# Patient Record
Sex: FEMALE | Race: BLACK OR AFRICAN AMERICAN
[De-identification: names, ages, dates, MRNs, and addresses within clinical notes are randomized per-mention and may not be internally consistent; named-entity substitution may affect disease eponyms.]

---

## 2017-01-17 ENCOUNTER — HOSPITAL ENCOUNTER (EMERGENCY)
Dept: HOSPITAL 10 - FTE | Age: 72
Discharge: HOME | End: 2017-01-17
Payer: MEDICARE

## 2017-01-17 VITALS
HEART RATE: 56 BPM | RESPIRATION RATE: 18 BRPM | DIASTOLIC BLOOD PRESSURE: 69 MMHG | SYSTOLIC BLOOD PRESSURE: 140 MMHG | TEMPERATURE: 98.3 F

## 2017-01-17 VITALS
WEIGHT: 184.09 LBS | BODY MASS INDEX: 31.43 KG/M2 | HEIGHT: 64 IN | WEIGHT: 184.09 LBS | HEIGHT: 64 IN | BODY MASS INDEX: 31.43 KG/M2

## 2017-01-17 DIAGNOSIS — Z79.82: ICD-10-CM

## 2017-01-17 DIAGNOSIS — I10: ICD-10-CM

## 2017-01-17 DIAGNOSIS — J20.9: Primary | ICD-10-CM

## 2017-01-17 PROCEDURE — 87400 INFLUENZA A/B EACH AG IA: CPT

## 2017-01-17 PROCEDURE — 99284 EMERGENCY DEPT VISIT MOD MDM: CPT

## 2017-01-17 PROCEDURE — 94664 DEMO&/EVAL PT USE INHALER: CPT

## 2017-01-17 NOTE — ERD
ER Documentation


Chief Complaint


Date/Time


DATE: 1/17/17 


TIME: 17:32


Chief Complaint


WHEEZING AND FEVER





HPI


This is a 71-year-old female who presents to the emergency room for evaluation 

of wheezing.  According to the patient's family member was at bedside this 

patient has been wheezing for the past 2 days.  She states the patient does 

have a fever which is periodic, and the patient was brought to the ER for 

evaluation.  She states that last sentence patient had the symptoms she was 

diagnosed with bronchitis.  Patient has been using her nephew's albuterol 

inhaler with mild relief of her wheezing





ROS


All systems reviewed and are negative except as per history of present illness.





Medications


Home Meds


Active Scripts


Famotidine* (Famotidine*) 20 Mg Tablet, 20 MG PO Q12 for 30 Days, #60 TAB


   Prov:YOMAIRA KENNEDY M.D.         6/5/16


Aspirin (Aspirin) 81 Mg Chew, 81 MG PO DAILY for 30 Days, #30 TAB


   Prov:YOMAIRA KENNEDY M.D.         6/5/16


Reported Medications


Losartan Potassium* (Cozaar*) 100 Mg Tablet, 100 MG PO DAILY, #30 TAB


   6/4/16


Simvastatin* (Zocor*) 20 Mg Tablet, 20 MG PO QHS, #30 TAB


   6/4/16


Amlodipine Besylate* (Norvasc*) 10 Mg Tablet, 10 MG PO DAILY, TAB


   6/4/16


Metoprolol Succinate* (Toprol XL*) 100 Mg Tab.sr.24h, 100 MG PO DAILY, #30 TAB


   6/4/16





Allergies


Allergies:  


Coded Allergies:  


     No Known Allergy (Unverified , 6/4/16)





PMhx/Soc


History of Surgery:  No


Anesthesia Reaction:  No


Hx Neurological Disorder:  No


Hx Respiratory Disorders:  No


Hx Cardiac Disorders:  Yes (HTN, HYPERCHOLESTEROL)


Hx Psychiatric Problems:  No


Hx Miscellaneous Medical Probl:  No


Hx Alcohol Use:  No


Hx Substance Use:  No


Hx Tobacco Use:  No





Physical Exam


Vitals





Vital Signs








  Date Time  Temp Pulse Resp B/P Pulse Ox O2 Delivery O2 Flow Rate FiO2


 


1/17/17 16:46  60 20  96   21


 


1/17/17 15:47 98.0 58 18 162/72 95   








Physical Exam


Const: No acute distress


Head:   Atraumatic 


Eyes:    Normal Conjunctiva


ENT:    Normal External Ears, Nose and Mouth.


Neck:               Full range of motion..~ No meningismus.


Resp:   Wheezing auscultated in the bilateral upper and lower lobes


Cardio:    Regular rate and rhythm, no murmurs


Abd:    Soft, non tender, non distended. Normal bowel sounds


Skin:    No petechiae or rashes


Back:    No midline or flank tenderness


Ext:    No cyanosis, or edema


Neur:    Awake and alert


Psych:    Normal Mood and Affect


Results 24 hrs





 Current Medications








 Medications


  (Trade)  Dose


 Ordered  Sig/Brittni


 Route


 PRN Reason  Start Time


 Stop Time Status Last Admin


Dose Admin


 


 Albuterol


  (Proventil 0.5%


  (Neb))  5 mg  ONCE  STAT


 NEB


   1/17/17 16:37


 1/17/17 16:38 DC 1/17/17 16:45


 


 


 Ipratropium


 Bromide


  (Atrovent 0.02%


  (Neb))  0.5 mg  ONCE  STAT


 NEB


   1/17/17 16:37


 1/17/17 16:38 DC 1/17/17 16:44


 


 


 Prednisone


  (Prednisone)  60 mg  ONCE  STAT


 PO


   1/17/17 16:37


 1/17/17 16:38 DC 1/17/17 16:44


 











Procedures/MDM


This 71-year-old female presents to the ER for evaluation of wheezing.  When I 

evaluated her this patient did have wheezing on my examination.  The patient 

was given a breathing treatment with albuterol, Atrovent.  This patient also 

receives p.o. prednisone.  On my reexamination this patient she is sitting in 

bed in no acute distress, her pulse ox is 100%.  Her wheezing has significantly 

subsided.  This patient will be discharged home at this time with a 

prescription for ProAir air, and prednisone.  Patient is influenza negative.





Departure


Diagnosis:  


 Primary Impression:  


 Acute bronchitis


 Additional Impression:  


 Wheezing


Condition:  Stable











LESVIA BRAXTON DO Jan 17, 2017 17:34

## 2017-01-22 ENCOUNTER — HOSPITAL ENCOUNTER (EMERGENCY)
Dept: HOSPITAL 10 - FTE | Age: 72
Discharge: HOME | End: 2017-01-22
Payer: MEDICARE

## 2017-01-22 VITALS — BODY MASS INDEX: 39.8 KG/M2 | WEIGHT: 171.96 LBS | HEIGHT: 55 IN

## 2017-01-22 DIAGNOSIS — J20.9: Primary | ICD-10-CM

## 2017-01-22 DIAGNOSIS — F17.210: ICD-10-CM

## 2017-01-22 DIAGNOSIS — I10: ICD-10-CM

## 2017-01-22 PROCEDURE — 71010: CPT

## 2017-01-22 NOTE — RADRPT
PROCEDURE:   XR Chest. 

 

CLINICAL INDICATION:   Productive cough.

 

TECHNIQUE:   Single frontal view. 

 

COMPARISON:   06/04/2016. 

 

FINDINGS:

The heart is enlarged.  There is calcification in the aorta consistent with atherosclerosis. 

The lungs are clear. 

There is no pleural effusion. 

There is no pneumothorax.   

 

IMPRESSION:

1.  Cardiomegaly and atherosclerosis.

2.  Clear lungs.  

 

RPTAT: QQ

_____________________________________________ 

.Jamar Menjivar MD, MD           Date    Time 

Electronically viewed and signed by .Jamar Menjivar MD, MD on 01/22/2017 11:52 

 

D:  01/22/2017 11:52  T:  01/22/2017 11:52

.R/

## 2017-01-22 NOTE — ERD
DATE OF SERVICE:  

 

 

HISTORY OF PRESENT ILLNESS:  The patient is a 71-year-old female coming in complaining of a cough fo
r 2 weeks.  Patient was seen here 4 days ago and was given prednisone inhaler but daughter states th
at she is still continuing having shortness of breath.  She denies any cardiac history, she has not 
taken antibiotics.  She denies any leg swelling, no hemoptysis.  No recent travel. She has had tacti
le fevers. No history of recent surgery. 

No cancer history.

 

SOCIAL HISTORY:  She does smoke 10 cigarettes a day.

 

MEDICAL PROBLEMS:  Hypertension.

 

ALLERGIES TO MEDICATIONS:  DENIES.

 

SURGICAL HISTORY:  Hysterectomy.

 

REVIEW OF SYSTEMS:  A 12-point review of systems was done.  Refer to HPI for positives, all other sy
stems negative.

 

PHYSICAL EXAMINATION

VITAL SIGNS:  Temperature 98, pulse 68, blood pressure 171/71, respiratory 18, O2 saturation 99% on 
room air.  Pain intensity is 0/10.

GENERAL:  The patient is well-appearing, well-nourished, no acute distress.

HEENT:  Atraumatic. Conjunctivae are pink. Pupils equal, round, and reactive to light. There is no s
cleral icterus.  Tympanic membranes clear bilaterally. Oropharynx clear. No nystagmus or photophobia
. 

LUNGS:  The patient has coarse breath sounds heard throughout.  There is no focal rhonchi, no wheezi
ng.  Breath sounds heard in all lung fields.  No stridor, no trismus and no drooling.

CHEST:  Clear to auscultation bilaterally. There are no rales, wheezes or rhonchi. 

EXTREMITIES:  There is no pitting edema noted to bilateral lower extremities.  Pulses are intact.

BACK:  No midline or flank tenderness.

SKIN:  There is no apparent rash or petechia. The skin is warm and dry.

 

EMERGENCY ROOM COURSE:  The patient had a 1-view chest x-ray done in the ER which showed cardiomegal
y and atherosclerosis, otherwise clear lungs.

 

DIAGNOSIS:  Bronchitis with antibiotic treatment.

 

MEDICAL DECISION MAKING:  Patient did have coarse breath sounds heard on auscultation.  I will treat
 patient with antibiotics; however, patient is afebrile.  The patient's oxygen saturation was reeval
uated and she continued to be 99% with walking on room air.  I have low suspicion for a CHF as patie
nt does not have abnormal findings on chest x-ray and patient does not have lower pitting edema to t
he extremities.  I have low suspicion for PE.  The patient is not tachycardic.  She is not complaini
ng of hemoptysis or pleuritic chest pain and I do not feel there is indication for blood work or fur
ther evaluation.  I have low suspicion for sepsis.  Patient was given antibiotics and told to have c
lose followup with primary doctor.  Low suspicion for cardiac abnormality as patient is not complain
ing of chest pain.  Patient has a Acharya complaining of cough.

 

DISCHARGE:  The patient is discharged stable.  Patient is given a prescription for doxycycline and t
old to follow up with primary care within 1 to 2 days for reevaluation.  The patient was told if sym
ptoms progress or worsen to return to the ER.  All other questions answered at time of discharge.  D
ischarge summary given at the time of departure.  Patient understood and complied with plan.

 

 

Dictated By: ERICA MCLAUGHLIN PA for DAE CHOU/JR

DD:    01/22/2017 13:18:48

DT:    01/22/2017 17:16:32

Conf#: [id number]

DID#:  [id number]

## 2018-10-15 ENCOUNTER — HOSPITAL ENCOUNTER (OUTPATIENT)
Age: 73
Discharge: HOME | End: 2018-10-15

## 2018-10-15 ENCOUNTER — HOSPITAL ENCOUNTER (OUTPATIENT)
Dept: HOSPITAL 91 - LAB | Age: 73
Discharge: HOME | End: 2018-10-15
Payer: MEDICARE

## 2018-10-15 DIAGNOSIS — Z01.818: Primary | ICD-10-CM

## 2018-10-15 LAB
ADD MAN DIFF?: NO
ADD UMIC: YES
ALANINE AMINOTRANSFERASE: 21 IU/L (ref 13–69)
ALBUMIN/GLOBULIN RATIO: 1.02
ALBUMIN: 3.7 G/DL (ref 3.3–4.9)
ALKALINE PHOSPHATASE: 61 IU/L (ref 42–121)
ANION GAP: 11 (ref 8–16)
ASPARTATE AMINO TRANSFERASE: 29 IU/L (ref 15–46)
BASOPHIL #: 0.1 10^3/UL (ref 0–0.1)
BASOPHILS %: 0.8 % (ref 0–2)
BILIRUBIN,DIRECT: 0 MG/DL (ref 0–0.2)
BILIRUBIN,TOTAL: 0.4 MG/DL (ref 0.2–1.3)
BLOOD UREA NITROGEN: 23 MG/DL (ref 7–20)
CALCIUM: 10 MG/DL (ref 8.4–10.2)
CARBON DIOXIDE: 33 MMOL/L (ref 21–31)
CHLORIDE: 103 MMOL/L (ref 97–110)
CHOL/HDL RATIO: 3.4 RATIO
CHOLESTEROL: 181 MG/DL (ref 100–200)
CREATININE: 0.78 MG/DL (ref 0.44–1)
EOSINOPHILS #: 0.1 10^3/UL (ref 0–0.5)
EOSINOPHILS %: 1.7 % (ref 0–7)
GLOBULIN: 3.6 G/DL (ref 1.3–3.2)
GLUCOSE: 104 MG/DL (ref 70–220)
HDL CHOLESTEROL: 53 MG/DL (ref 33–92)
HEMATOCRIT: 47.4 % (ref 37–47)
HEMOGLOBIN: 14.9 G/DL (ref 12–16)
IMMATURE GRANS #M: 0.03 10^3/UL (ref 0–0.03)
IMMATURE GRANS % (M): 0.5 % (ref 0–0.43)
INR: 0.96
LDL CHOLESTEROL,CALCULATED: 88 MG/DL
LYMPHOCYTES #: 1.6 10^3/UL (ref 0.8–2.9)
LYMPHOCYTES %: 23.4 % (ref 15–51)
MEAN CORPUSCULAR HEMOGLOBIN: 28.2 PG (ref 29–33)
MEAN CORPUSCULAR HGB CONC: 31.4 G/DL (ref 32–37)
MEAN CORPUSCULAR VOLUME: 89.8 FL (ref 82–101)
MEAN PLATELET VOLUME: 10.7 FL (ref 7.4–10.4)
MONOCYTE #: 0.5 10^3/UL (ref 0.3–0.9)
MONOCYTES %: 7.4 % (ref 0–11)
NEUTROPHIL #: 4.4 10^3/UL (ref 1.6–7.5)
NEUTROPHILS %: 66.2 % (ref 39–77)
NUCLEATED RED BLOOD CELLS #: 0 10^3/UL (ref 0–0)
NUCLEATED RED BLOOD CELLS%: 0 /100WBC (ref 0–0)
PARTIAL THROMBOPLASTIN TIME: 40.3 SEC (ref 23–35)
PLATELET COUNT: 221 10^3/UL (ref 140–415)
POTASSIUM: 5.1 MMOL/L (ref 3.5–5.1)
PROTIME: 12.9 SEC (ref 11.9–14.9)
PT RATIO: 1
RED BLOOD COUNT: 5.28 10^6/UL (ref 4.2–5.4)
RED CELL DISTRIBUTION WIDTH: 13.6 % (ref 11.5–14.5)
SODIUM: 142 MMOL/L (ref 135–144)
TOTAL PROTEIN: 7.3 G/DL (ref 6.1–8.1)
TRIGLYCERIDES: 198 MG/DL (ref 0–149)
UR ASCORBIC ACID: NEGATIVE MG/DL
UR BILIRUBIN (DIP): NEGATIVE MG/DL
UR BLOOD (DIP): (no result) MG/DL
UR CLARITY: (no result)
UR COLOR: YELLOW
UR GLUCOSE (DIP): NEGATIVE MG/DL
UR KETONES (DIP): (no result) MG/DL
UR LEUKOCYTE ESTERASE (DIP): NEGATIVE LEU/UL
UR NITRITE (DIP): NEGATIVE MG/DL
UR PH (DIP): 5 (ref 5–9)
UR RBC: 7 /HPF (ref 0–5)
UR SPECIFIC GRAVITY (DIP): 1.02 (ref 1–1.03)
UR TOTAL PROTEIN (DIP): (no result) MG/DL
UR UROBILINOGEN (DIP): (no result) MG/DL
UR WBC: 0 /HPF (ref 0–5)
WHITE BLOOD COUNT: 6.7 10^3/UL (ref 4.8–10.8)

## 2018-10-15 PROCEDURE — 80053 COMPREHEN METABOLIC PANEL: CPT

## 2018-10-15 PROCEDURE — 85610 PROTHROMBIN TIME: CPT

## 2018-10-15 PROCEDURE — 85025 COMPLETE CBC W/AUTO DIFF WBC: CPT

## 2018-10-15 PROCEDURE — 80061 LIPID PANEL: CPT

## 2018-10-15 PROCEDURE — 81001 URINALYSIS AUTO W/SCOPE: CPT

## 2018-10-15 PROCEDURE — 71046 X-RAY EXAM CHEST 2 VIEWS: CPT

## 2018-10-15 PROCEDURE — 85730 THROMBOPLASTIN TIME PARTIAL: CPT

## 2019-07-05 ENCOUNTER — HOSPITAL ENCOUNTER (EMERGENCY)
Dept: HOSPITAL 91 - E/R | Age: 74
Discharge: HOME | End: 2019-07-05
Payer: MEDICARE

## 2019-07-05 ENCOUNTER — HOSPITAL ENCOUNTER (EMERGENCY)
Dept: HOSPITAL 10 - E/R | Age: 74
Discharge: HOME | End: 2019-07-05
Payer: MEDICARE

## 2019-07-05 VITALS — RESPIRATION RATE: 18 BRPM | SYSTOLIC BLOOD PRESSURE: 165 MMHG | HEART RATE: 68 BPM | DIASTOLIC BLOOD PRESSURE: 57 MMHG

## 2019-07-05 VITALS
BODY MASS INDEX: 28.19 KG/M2 | HEIGHT: 66 IN | WEIGHT: 175.38 LBS | WEIGHT: 175.38 LBS | HEIGHT: 66 IN | BODY MASS INDEX: 28.19 KG/M2

## 2019-07-05 DIAGNOSIS — Z79.82: ICD-10-CM

## 2019-07-05 DIAGNOSIS — I10: ICD-10-CM

## 2019-07-05 DIAGNOSIS — Z87.891: ICD-10-CM

## 2019-07-05 DIAGNOSIS — I48.91: Primary | ICD-10-CM

## 2019-07-05 LAB
ADD MAN DIFF?: NO
ALANINE AMINOTRANSFERASE: 15 IU/L (ref 13–69)
ALBUMIN/GLOBULIN RATIO: 1.25
ALBUMIN: 4.5 G/DL (ref 3.3–4.9)
ALKALINE PHOSPHATASE: 77 IU/L (ref 42–121)
ANION GAP: 10 (ref 5–13)
ASPARTATE AMINO TRANSFERASE: 25 IU/L (ref 15–46)
B-TYPE NATRIURETIC PEPTIDE: 568 PG/ML (ref 0–125)
BASOPHIL #: 0.1 10^3/UL (ref 0–0.1)
BASOPHILS %: 0.8 % (ref 0–2)
BILIRUBIN,DIRECT: 0 MG/DL (ref 0–0.2)
BILIRUBIN,TOTAL: 0.2 MG/DL (ref 0.2–1.3)
BLOOD UREA NITROGEN: 23 MG/DL (ref 7–20)
CALCIUM: 9.8 MG/DL (ref 8.4–10.2)
CARBON DIOXIDE: 28 MMOL/L (ref 21–31)
CHLORIDE: 105 MMOL/L (ref 97–110)
CHOL/HDL RATIO: 3.5 RATIO
CHOLESTEROL: 217 MG/DL (ref 100–200)
CREATININE: 0.89 MG/DL (ref 0.44–1)
EOSINOPHILS #: 0.2 10^3/UL (ref 0–0.5)
EOSINOPHILS %: 2.1 % (ref 0–7)
GLOBULIN: 3.6 G/DL (ref 1.3–3.2)
GLUCOSE: 86 MG/DL (ref 70–220)
HDL CHOLESTEROL: 62 MG/DL (ref 33–92)
HEMATOCRIT: 47.6 % (ref 37–47)
HEMOGLOBIN: 15.5 G/DL (ref 12–16)
IMMATURE GRANS #M: 0.06 10^3/UL (ref 0–0.03)
IMMATURE GRANS % (M): 0.6 % (ref 0–0.43)
INR: 0.86
LDL CHOLESTEROL,CALCULATED: 106 MG/DL
LYMPHOCYTES #: 2.9 10^3/UL (ref 0.8–2.9)
LYMPHOCYTES %: 27.4 % (ref 15–51)
MEAN CORPUSCULAR HEMOGLOBIN: 28.4 PG (ref 29–33)
MEAN CORPUSCULAR HGB CONC: 32.6 G/DL (ref 32–37)
MEAN CORPUSCULAR VOLUME: 87.2 FL (ref 82–101)
MEAN PLATELET VOLUME: 10.7 FL (ref 7.4–10.4)
MONOCYTE #: 0.9 10^3/UL (ref 0.3–0.9)
MONOCYTES %: 8.6 % (ref 0–11)
NEUTROPHIL #: 6.3 10^3/UL (ref 1.6–7.5)
NEUTROPHILS %: 60.5 % (ref 39–77)
NUCLEATED RED BLOOD CELLS #: 0 10^3/UL (ref 0–0)
NUCLEATED RED BLOOD CELLS%: 0 /100WBC (ref 0–0)
PLATELET COUNT: 255 10^3/UL (ref 140–415)
POTASSIUM: 4 MMOL/L (ref 3.5–5.1)
PROTIME: 11.8 SEC (ref 11.9–14.9)
PT RATIO: 0.9
RED BLOOD COUNT: 5.46 10^6/UL (ref 4.2–5.4)
RED CELL DISTRIBUTION WIDTH: 13.8 % (ref 11.5–14.5)
SODIUM: 143 MMOL/L (ref 135–144)
THYROID STIMULATING HORMONE: 2.03 MIU/L (ref 0.47–4.68)
TOTAL PROTEIN: 8.1 G/DL (ref 6.1–8.1)
TRIGLYCERIDES: 245 MG/DL (ref 0–149)
TROPONIN-I: < 0.012 NG/ML (ref 0–0.12)
WHITE BLOOD COUNT: 10.4 10^3/UL (ref 4.8–10.8)

## 2019-07-05 PROCEDURE — 93306 TTE W/DOPPLER COMPLETE: CPT

## 2019-07-05 PROCEDURE — 71045 X-RAY EXAM CHEST 1 VIEW: CPT

## 2019-07-05 PROCEDURE — 80061 LIPID PANEL: CPT

## 2019-07-05 PROCEDURE — 85610 PROTHROMBIN TIME: CPT

## 2019-07-05 PROCEDURE — 83880 ASSAY OF NATRIURETIC PEPTIDE: CPT

## 2019-07-05 PROCEDURE — 96374 THER/PROPH/DIAG INJ IV PUSH: CPT

## 2019-07-05 PROCEDURE — 93005 ELECTROCARDIOGRAM TRACING: CPT

## 2019-07-05 PROCEDURE — 99285 EMERGENCY DEPT VISIT HI MDM: CPT

## 2019-07-05 PROCEDURE — 96372 THER/PROPH/DIAG INJ SC/IM: CPT

## 2019-07-05 PROCEDURE — 80053 COMPREHEN METABOLIC PANEL: CPT

## 2019-07-05 PROCEDURE — 84443 ASSAY THYROID STIM HORMONE: CPT

## 2019-07-05 PROCEDURE — 36415 COLL VENOUS BLD VENIPUNCTURE: CPT

## 2019-07-05 PROCEDURE — 85025 COMPLETE CBC W/AUTO DIFF WBC: CPT

## 2019-07-05 PROCEDURE — 84484 ASSAY OF TROPONIN QUANT: CPT

## 2019-07-05 RX ADMIN — ENOXAPARIN SODIUM 1 MG: 100 INJECTION SUBCUTANEOUS at 04:02

## 2019-07-05 RX ADMIN — METOPROLOL SUCCINATE 1 MG: 100 TABLET, EXTENDED RELEASE ORAL at 11:23

## 2019-07-05 RX ADMIN — AMLODIPINE BESYLATE 1 MG: 10 TABLET ORAL at 10:30

## 2019-07-05 RX ADMIN — MELOXICAM 1 MG: 15 TABLET ORAL at 10:29

## 2019-07-05 RX ADMIN — PANTOPRAZOLE SODIUM 1 MG: 40 TABLET, DELAYED RELEASE ORAL at 10:30

## 2019-07-05 RX ADMIN — METOPROLOL TARTRATE 1 MG: 5 INJECTION, SOLUTION INTRAVENOUS at 03:22

## 2019-07-05 NOTE — RADRPT
Echocardiogram Report

 

Patient Name: REYES,ALIDAPatient ID: [id number]

: 1945 (73y 9m)Study Date: 2019 10:06:41 AM

Gender: FAccession #: [id number]

Tech: Massimo [facility]                 Location: ER-5         

Ref.Physician: CECILIA MOTA            Height(Cm):            

 

BSA: Weight(Kg):

Quality: AdequateOrder Physician: CECILIA MOTA

Account #:

 

 

Procedures:

 

Echocardiographic Report:

Transthoracic echocardiogram with complete 2D, M-Mode, and doppler 

examination.

 

Indications:

 

New Atrial Fibrillation.

 

 

Measurements:

2D/M Mode                                          Doppler                                           
    

Measurement    Value    Normal Range               Measurement      Value    Normal Range            
    

LVIDd 2D       4.4      [ 3.8 - 5.2 ] cm           AV Peak Spencer      1.5      [ 100.0 - 170.0 ] cm/sec
    

LVIDs 2D       2.8      [ 2.2 - 3.5 ] cm           AV Peak PG       9.0      [ 2.0 - 9.0 ] mmHg      
    

LVPWd 2D       1.3      [ 0.6 - 0.9 ] cm           LVOT Peak Spencer    1.1      [ 70.0 - 110.0 ] cm/sec 
    

IVSd 2D        1.9      [ 0.6 - 0.9 ] cm           LVOT Peak PG     5.0      [ 2.0 - 6.0 ] mmHg      
    

AoR Diam 2D    3.2      [ 2.3 - 3.1 ] cm           MV E Peak Spencer    0.5      [ 60.0 - 130.0 ] cm/sec 
    

EDV 2D         87.7     [ 46.0 - 106.0 ] ml        MV A Peak Spencer    0.9      [ 100.0 - 120.0 ] cm/sec
    

ESV 2D         28.8     [ 14.0 - 42.0 ] ml         MV E/A           0.6      [ 0.8 - 1.5 ] ratio     
    

EF 2D          67.2     [ 54.0 - 74.0 ] percent    MV Decel Time    278      [ 104 - 258 ] msec      
    

LA Dimen 2D    3.6      [ 2.7 - 3.8 ] cm           MV E/A           0.6      [ 0.8 - 1.5 ] ratio     
    

                                                   TR Peak Spencer      3.1      [ 100.0 - 280.0 ] cm/sec
    

                                                   TR Peak PG       38.0     mmHg                    
    

                                                   RVSP             41.0     [ 10.0 - 36.0 ] mmHg    
    

 

Findings:

 

Left Ventricle:

Normal left ventricular systolic function. Normal left ventricular 

cavity size. Severe asymmetric septal hypertrophy. Ejection fraction is 

visually estimated at 60 %. Tissue Doppler/Mitral Doppler indices are 

indeterminate in this study due to the presence of atrial fibrillation.

 

Right Ventricle:

Normal right ventricular size. Normal right ventricular systolic 

function.

 

Left Atrium:

The left atrium is normal in size.

 

Right Atrium:

The right atrium is normal in size.

 

Mitral Valve:

Mild mitral leaflet calcification. Mild mitral annular calcification. 

Trace mitral regurgitation.

 

Aortic Valve:

No significant aortic stenosis or insufficiency. Aortic cusps appear 

mildly calcified.

 

Tricuspid Valve:

Normal appearance of the tricuspid valve. The estimated Peak RVSP is 41 

mmHg. There is mild tricuspid regurgitation.

 

Pericardium:

Normal pericardium with no significant pericardial effusion.

 

Aorta:

Normal aortic root.

 

IVC:

Normal size and normal respiratory collapse consistent with normal right 

atrial pressure.

 

 

Conclusions:

 

Normal left ventricular systolic function. Normal left ventricular 

cavity size. Severe asymmetric septal hypertrophy. Ejection fraction is 

visually estimated at 60 %. Tissue Doppler/Mitral Doppler indices are 

indeterminate in this study due to the presence of atrial fibrillation.

n.

 

Normal appearance of the tricuspid valve. The estimated Peak RVSP is 41 

mmHg. There is mild tricuspid regurgitation.

 

Mild mitral leaflet calcification. Mild mitral annular calcification. 

Trace mitral regurgitation.

 

No significant aortic stenosis or insufficiency. Aortic cusps appear 

mildly calcified.

 

Electronically Signed By:

 

Glen Arciniega

2019 11:20:49 PDT

## 2019-07-05 NOTE — HP
Date/Time of Note


Date/Time of Note


DATE: 7/5/19 


TIME: 09:47





Assessment/Plan


VTE Prophylaxis


SCD contraindicated:  other


Pharmacological prophylaxis:  LMWH


Pharm contraindication:  other





Lines/Catheters


IV Catheter Type (from Nrsg):  Saline Lock





Assessment/Plan


Assessment/Plan


Palpitations-


Atrial fibrillation


   - Admit to tele


   - Cardiology consult- Dr Palomino notified


   - see admission orders


   - CMP, CBC, LDH, TSH, T4


   - Cardiac diet


   - 2D echo- fu 


- Cardiomegaly


- Hypertension 


- Elevated BNP- r/o CHF 


-Heavy Tobaccoism 


   - Provide smoking cessation 


-Lovenox


-Protonix


 Patient seen in collaboration with Dr Kwan


Result Diagram:  


7/5/19 0305                                                                     


          7/5/19 0305





Results 24hrs





Laboratory Tests


               Test
                                7/5/19
03:05


               White Blood Count                         10.4  #


               Red Blood Count                           5.46  H


               Hemoglobin                                 15.5


               Hematocrit                                47.6  H


               Mean Corpuscular Volume                    87.2


               Mean Corpuscular Hemoglobin               28.4  L


               Mean Corpuscular Hemoglobin
Concent       32.6  



               Red Cell Distribution Width                13.8


               Platelet Count                              255


               Mean Platelet Volume                      10.7  H


               Immature Granulocytes %                  0.600  H


               Neutrophils %                              60.5


               Lymphocytes %                              27.4


               Monocytes %                                 8.6


               Eosinophils %                               2.1


               Basophils %                                 0.8


               Nucleated Red Blood Cells %                 0.0


               Immature Granulocytes #                  0.060  H


               Neutrophils #                               6.3


               Lymphocytes #                               2.9


               Monocytes #                                 0.9


               Eosinophils #                               0.2


               Basophils #                                 0.1


               Nucleated Red Blood Cells #                 0.0


               Prothrombin Time                          11.8  L


               Prothrombin Time Ratio                      0.9


               INR International Normalized
Ratio        0.86  



               Sodium Level                                143


               Potassium Level                             4.0


               Chloride Level                              105


               Carbon Dioxide Level                         28


               Anion Gap                                    10


               Blood Urea Nitrogen                         23  H


               Creatinine                                 0.89


               Est Glomerular Filtrat Rate
mL/min     



               Glucose Level                                86


               Calcium Level                               9.8


               Total Bilirubin                             0.2


               Direct Bilirubin                           0.00


               Indirect Bilirubin                          0.2


               Aspartate Amino Transf
(AST/SGOT)           25  



               Alanine Aminotransferase
(ALT/SGPT)         15  



               Alkaline Phosphatase                         77


               Troponin I                           < 0.012


               B-Type Natriuretic Peptide                 568  H


               Total Protein                               8.1


               Albumin                                     4.5


               Globulin                                  3.60  H


               Albumin/Globulin Ratio                     1.25


               Triglycerides Level                        245  H


               Cholesterol Level                          217  H


               LDL Cholesterol, Calculated                 106


               HDL Cholesterol                              62


               Cholesterol/HDL Ratio                       3.5


               Thyroid Stimulating Hormone
(TSH)        2.030  









HPI/ROS


Admit Date/Time


Admit Date/Time








ROS


HPI


This is a 73-year-old female with a history of chronic tobacco use, smoking 


about a pack daily,  history of atrial fibrillation is  admitted for c/o of 


palpitations. Patient clarifies that she has never been diagnosed with atrial 


fibrillation.  She denies shortness of breath, denies chest pain.  There are no 


alleviating or aggravating factors, her symptoms are constant.





ROS


All systems reviewed and are negative except as per history of present illness.





Medications


Home Meds


Active Scripts


Prednisone* (Prednisone*) 20 Mg Tab, 40 MG PO DAILY for 4 Days, TAB


   Prov:JACQUELINE MCLAUGHLIN PA-C         1/22/17


Doxycycline Hyclate* (Doxycycline Hyclate*) 100 Mg Tablet.dr, 100 MG PO BID for 


10 Days, TAB


   Prov:JACQUELINE MCLAUGHLIN PA-C         1/22/17


Prednisone* (Prednisone*) 20 Mg Tab, 40 MG PO DAILY for 4 Days, #8 TAB


   Prov:LESVIA BRAXTON DO         1/17/17


Albuterol Sulfate* (Proair HFA*) 8.5 Gm Hfa.aer.ad, 2 PUFF INH Q4, #1 INHALER


   Prov:LESVIA BRAXTON DO         1/17/17


Famotidine* (Famotidine*) 20 Mg Tablet, 20 MG PO Q12 for 30 Days, #60 TAB


   Prov:YOMAIRA KENNEDY M.D.         6/5/16


Aspirin (Aspirin) 81 Mg Chew, 81 MG PO DAILY for 30 Days, #30 TAB


   Prov:YOMAIRA KENNEDY M.D.         6/5/16


Reported Medications


Losartan Potassium* (Cozaar*) 100 Mg Tablet, 100 MG PO DAILY, #30 TAB


   6/4/16


Simvastatin* (Zocor*) 20 Mg Tablet, 20 MG PO QHS, #30 TAB


   6/4/16


Amlodipine Besylate* (Norvasc*) 10 Mg Tablet, 10 MG PO DAILY, TAB


   6/4/16


Metoprolol Succinate* (Toprol XL*) 100 Mg Tab.sr.24h, 100 MG PO DAILY, #30 TAB


   6/4/16





Allergies


Allergies:  


Coded Allergies:  


     No Known Allergy (Unverified , 1/22/17)








EKG: 


Rate/Rhythm:       Atrial fibrillation irregularly irregular


QRS, ST, T-waves:    No changes consistent w/ acute ischemia


Impression:      No evidence of ischemia. a fib





Chest X-ray


Soft Tissue:                     No acute abnormalities


Bones:                     No acute abnormalities


Mediastinum/Cardiac Silhouette/Lungs:   Cardiomegaly


ENT:  no complaints


Respiratory:  no complaints


Cardiovascular:  no complaints


Gastrointestinal:  no complaints


Genitourinary:  no complaints


Musculoskeletal:  no complaints


Skin:  no complaints





PMH/Family/Social


Past Medical History


PMhx/Soc


History of Surgery:  No


Anesthesia Reaction:  No


Hx Neurological Disorder:  No


Hx Respiratory Disorders:  No


Hx Cardiac Disorders:  Yes (HTN,  HIGH CHOLESTEROL)


Hx Psychiatric Problems:  No


Hx Miscellaneous Medical Probl:  No


Hx Alcohol Use:  No


Hx Substance Use:  No


Hx Tobacco Use:  No


Smoking Status:  Never smoker


Medications





Current Medications


Ondansetron HCl (Zofran Inj) 4 mg ER BRIDGE PRN IV NAUSEA/VOMITING;  Start 


7/5/19 at 04:00;  Stop 7/6/19 at 03:59


Acetaminophen (Tylenol Tab) 650 mg ER BRIDGE PRN PO .MILD PAIN 1-3 OR TEMP;  


Start 7/5/19 at 04:00;  Stop 7/6/19 at 03:59


Coded Allergies:  


     No Known Allergy (Unverified , 7/5/19)





Past Surgical History


Past Surgical Hx:  appendectomy, other (Left Knee surgery)





Family History


Significant Family History:  cancer, hypertension





Social History


Alcohol Use:  occasionally


Smoking Status:  Heavy tobacco smoker


Drug Use:  none





Exam/Review of Systems


Vital Signs


Vitals





Vital Signs


  Date      Temp  Pulse  Resp  B/P (MAP)   Pulse Ox  O2          O2 Flow    FiO2


Time                                                 Delivery    Rate


    7/5/19           90    18      144/66        97  Room Air


     06:59                           (92)


    7/5/19  98.0


     02:42








Exam


Constitutional:  alert, well developed


Psych:  nl mood/affect


Head:  atraumatic


Eyes:  nl lids, nl sclera


ENMT:  nl external ears & nose


Neck:  non-tender


Respiratory:  clear to auscultation


Cardiovascular:  nl pulses, other (s1s2)


Gastrointestinal:  soft, non-tender


Musculoskeletal:  nl extremities to inspection


Extremities:  normal pulses


Neurological:  nl speech


Skin:  nl turgor


Lymph:  nontender











CECILIA MOTA                  Jul 5, 2019 09:57

## 2023-03-06 NOTE — ERD
ER Documentation


Chief Complaint


Chief Complaint





Pt felt heart palpitations, hx of afib, HR 58 - 67





HPI


This is a 73-year-old female with a history of chronic tobacco use, smoking 


about a pack daily.  She is presenting today brought in by EMS for symptoms of 


palpitations.  Triage note states patient has a history of atrial fibrillation, 


however patient clarifies that she has never been diagnosed with atrial 


fibrillation.  She has no cardiac history that she knows of, she woke up from 


sleep with symptoms of palpitations.  She denies shortness of breath, denies 


chest pain.  There are no alleviating or aggravating factors, her symptoms are 


constant.





ROS


All systems reviewed and are negative except as per history of present illness.





Medications


Home Meds


Active Scripts


Prednisone* (Prednisone*) 20 Mg Tab, 40 MG PO DAILY for 4 Days, TAB


   Prov:JACQUELINE MCLAUGHLIN PA-C         1/22/17


Doxycycline Hyclate* (Doxycycline Hyclate*) 100 Mg Tablet.dr, 100 MG PO BID for 


10 Days, TAB


   Prov:JACQUELINE MCLAUGHLIN PA-C         1/22/17


Prednisone* (Prednisone*) 20 Mg Tab, 40 MG PO DAILY for 4 Days, #8 TAB


   Prov:LESVIA BRAXTON DO         1/17/17


Albuterol Sulfate* (Proair HFA*) 8.5 Gm Hfa.aer.ad, 2 PUFF INH Q4, #1 INHALER


   Prov:LESVIA BRAXTON DO         1/17/17


Famotidine* (Famotidine*) 20 Mg Tablet, 20 MG PO Q12 for 30 Days, #60 TAB


   Prov:YOMAIRA KENNEDY M.D.         6/5/16


Aspirin (Aspirin) 81 Mg Chew, 81 MG PO DAILY for 30 Days, #30 TAB


   Prov:YOMAIRA KENNEDY M.D.         6/5/16


Reported Medications


Losartan Potassium* (Cozaar*) 100 Mg Tablet, 100 MG PO DAILY, #30 TAB


   6/4/16


Simvastatin* (Zocor*) 20 Mg Tablet, 20 MG PO QHS, #30 TAB


   6/4/16


Amlodipine Besylate* (Norvasc*) 10 Mg Tablet, 10 MG PO DAILY, TAB


   6/4/16


Metoprolol Succinate* (Toprol XL*) 100 Mg Tab.sr.24h, 100 MG PO DAILY, #30 TAB


   6/4/16





Allergies


Allergies:  


Coded Allergies:  


     No Known Allergy (Unverified , 1/22/17)





PMhx/Soc


History of Surgery:  No


Anesthesia Reaction:  No


Hx Neurological Disorder:  No


Hx Respiratory Disorders:  No


Hx Cardiac Disorders:  Yes (HTN,  HIGH CHOLESTEROL)


Hx Psychiatric Problems:  No


Hx Miscellaneous Medical Probl:  No


Hx Alcohol Use:  No


Hx Substance Use:  No


Hx Tobacco Use:  No


Smoking Status:  Never smoker





Physical Exam


Vitals





Vital Signs


  Date      Temp  Pulse  Resp  B/P (MAP)   Pulse Ox  O2          O2 Flow    FiO2


Time                                                 Delivery    Rate


    7/5/19  98.0     60    20      192/87        97


     02:42                          (122)





Physical Exam


Const:   Well-developed, well-nourished nontoxic


Head:   Atraumatic 


Eyes:    Normal Conjunctiva


ENT:    Normal External Ears, Nose and Mouth.


Neck:               Full range of motion. No meningismus.


Resp:   Clear to auscultation bilaterally, no wheezes rales or rhonchi


Cardio:   Irregularly irregular no murmurs.


Abd:    Soft, non tender, non distended. Normal bowel sounds


Skin:   No petechiae or rashes


Back:   No midline or flank tenderness


Ext:    No cyanosis, or edema


Neur:   Awake and alert


Psych:    Normal Mood and Affect


Result Diagram:  


7/5/19 0305





Results 24 hrs





Laboratory Tests


              Test
                                  7/5/19
03:05


              White Blood Count                     10.4 10^3/ul


              Red Blood Count                       5.46 10^6/ul


              Hemoglobin                               15.5 g/dl


              Hematocrit                                  47.6 %


              Mean Corpuscular Volume                    87.2 fl


              Mean Corpuscular Hemoglobin                28.4 pg


              Mean Corpuscular Hemoglobin
Concent     32.6 g/dl 



              Red Cell Distribution Width                 13.8 %


              Platelet Count                         255 10^3/UL


              Mean Platelet Volume                       10.7 fl


              Immature Granulocytes %                    0.600 %


              Neutrophils %                               60.5 %


              Lymphocytes %                               27.4 %


              Monocytes %                                  8.6 %


              Eosinophils %                                2.1 %


              Basophils %                                  0.8 %


              Nucleated Red Blood Cells %            0.0 /100WBC


              Immature Granulocytes #              0.060 10^3/ul


              Neutrophils #                          6.3 10^3/ul


              Lymphocytes #                          2.9 10^3/ul


              Monocytes #                            0.9 10^3/ul


              Eosinophils #                          0.2 10^3/ul


              Basophils #                            0.1 10^3/ul


              Nucleated Red Blood Cells #            0.0 10^3/ul





Current Medications


 Medications
   Dose
          Sig/Brittni
       Start Time
   Status  Last


 (Trade)       Ordered        Route
 PRN     Stop Time              Admin
Dose


                              Reason                                Admin


 Metoprolol
    5 mg           ONCE  ONCE
    7/5/19        DC            7/5/19


Tartrate
                     IV
            03:30
 7/5/19                03:22



(Lopressor)                                  03:31








Procedures/MDM


73-year-old female presents for evaluation of palpitations.  EKG showed atrial f


ibrillation with rapid ventricular response.  Heart rate was noted to be 110, 


she was given IV metoprolol.  Given that she is presenting with new onset atrial


fibrillation she will require admission for further work-up.  Patient had no 


chest pain, no infectious symptoms, her chads vas score is 3





Accepting Care Team:


Current data and ongoing care discussed.


Primary:      Gabbie


Consulting:      None


Outstanding Data:   none





EKG: 


Rate/Rhythm:       Atrial fibrillation irregularly irregular


QRS, ST, T-waves:    No changes consistent w/ acute ischemia


Impression:      No evidence of ischemia. a fib





Chest X-ray 1V Interpreted by me:


Soft Tissue:                     No acute abnormalities


Bones:                     No acute abnormalities


Mediastinum/Cardiac Silhouette/Lungs:   Cardiomegaly





Departure


Diagnosis:  


   Primary Impression:  


   Palpitations


   Additional Impression:  


   Atrial fibrillation


   Atrial fibrillation type:  unspecified  Qualified Codes:  I48.91 - 


   Unspecified atrial fibrillation


Condition:  Serious











DAE NICOLE MD                Jul 5, 2019 03:38
Patient tolerated procedure well.
Patient tolerated procedure well.